# Patient Record
Sex: FEMALE | Race: WHITE | NOT HISPANIC OR LATINO | URBAN - METROPOLITAN AREA
[De-identification: names, ages, dates, MRNs, and addresses within clinical notes are randomized per-mention and may not be internally consistent; named-entity substitution may affect disease eponyms.]

---

## 2023-03-08 ENCOUNTER — OFFICE (OUTPATIENT)
Dept: URBAN - METROPOLITAN AREA CLINIC 14 | Facility: CLINIC | Age: 43
End: 2023-03-08

## 2023-03-08 VITALS
OXYGEN SATURATION: 99 % | WEIGHT: 211 LBS | HEIGHT: 67 IN | HEART RATE: 81 BPM | SYSTOLIC BLOOD PRESSURE: 151 MMHG | DIASTOLIC BLOOD PRESSURE: 94 MMHG

## 2023-03-08 DIAGNOSIS — R12 HEARTBURN: ICD-10-CM

## 2023-03-08 DIAGNOSIS — R10.12 LEFT UPPER QUADRANT PAIN: ICD-10-CM

## 2023-03-08 DIAGNOSIS — M19.90 UNSPECIFIED OSTEOARTHRITIS, UNSPECIFIED SITE: ICD-10-CM

## 2023-03-08 PROCEDURE — 99203 OFFICE O/P NEW LOW 30 MIN: CPT | Performed by: STUDENT IN AN ORGANIZED HEALTH CARE EDUCATION/TRAINING PROGRAM

## 2023-03-08 RX ORDER — FAMOTIDINE 20 MG/1
40 TABLET, FILM COATED ORAL
Qty: 60 | Refills: 3 | Status: ACTIVE
Start: 2023-03-08

## 2023-03-08 NOTE — SERVICEHPINOTES
Ms. Vanna Hernandez Is a 42-year-old white female with past medical history of degenerative disc disease, previous cholecystectomy and hysterectomy, who presents to [facility] today for LUQ pain x 1 month. Patient reports that her L abdominal pain started about 1 month ago.She reports the pain is constant, about 5/10 on the pain scale, she does not endorse any associated nausea, vomiting, worse with eating or having a bowel movement.  No improvement with any certain medications.  She does say that it improves some with ice rather than heat.   No unintentional weight loss, no blood in the stool or black tarry stool.  She does not have any significant acid reflux that she suffers from persistently.  She does use Tums maybe once a month after eating a big meal.  No family history of GI malignancies or liver disease.  No previous EGD or colonoscopy.  She had a hysterectomy last August and also a gallbladder removed in 2006. she recently saw the orthopedic surgeon who told her that she has severe degenerative disc disease and recommended physical therapy exercise and healthy eating which she is doing.  She is a stay-at-home mom and reports that her back pain has improved since she started exercising but this left upper quadrant pain persists.  She describes the pain as being both on the rib cage itself in the mid axillary line as well as in the left upper quadrant soft tissue area.  She does not take any significant NSAIDs.

## 2023-03-08 NOTE — SERVICENOTES
The patient's symptoms seem very atypical for a GI related cause given that she has this pain that is constant, worse in certain positions, not related to eating or fasting, no bleeding, no persistent heartburn symptoms, no unintentional weight loss, we will check her for H pylori stool antigen and give a trial of famotidine b.i.d. to see if this addresses any element of reflux that might be contributing to her pain.  I encouraged her to continue to follow up with her orthopedic physician as well as use a heating pad or ice as needed.  Unclear if this may be related to her degenerative disc disease or possibly her left shoulder dysfunction.  At next visit if no better then we may consider doing EGD or CT scan.   I offered the patient to perform EGD and/or imaging at this visit but she would like to try noninvasive measures 1st. Set colon recall for the patient's 44th birthday.   I spent 30 minutes discussing with the patient the treatment plan, documenting, and ordering appropriate testing on this patient.